# Patient Record
Sex: FEMALE | Race: BLACK OR AFRICAN AMERICAN | HISPANIC OR LATINO | URBAN - METROPOLITAN AREA
[De-identification: names, ages, dates, MRNs, and addresses within clinical notes are randomized per-mention and may not be internally consistent; named-entity substitution may affect disease eponyms.]

---

## 2018-01-10 NOTE — PROGRESS NOTES
Assessment   1  School physical exam (V70 5) (Z02 0)  2  Large breasts (611 1) (N62)  3  Bilateral shoulder pain, unspecified chronicity (719 41) (M25 511,M25 512)  4  Low back pain (724 2) (M54 5)    Plan   3 - Yana SEGURA, Bridgette Martin (Plastic And Reconstructive Surgery) Physician Referral  Consult  Status: Hold For - Scheduling  Requested for: [id number]  Ordered; For: Bilateral shoulder pain, unspecified chronicity, Large breasts; Ordered By: Liza Escobedo  Performed:   Due: [id number]     Discussion/Summary    Impression:   No development, elimination, feeding, skin and sleep concerns  Growth concerns include body mass index of 30  no medical problems  Anticipatory guidance addressed as per the history of present illness section  no vaccines in office due to refrigerator problem, pt to obtain required vaccinations at other office or [facility]  Information discussed with patient  Referred to plastic surg for breast problem  f/u here prn/routine  The patient was counseled regarding instructions for management, risk factor reductions, impressions  Chief Complaint  Pt presents today for a college physical   Pt would like to discuss about breast reduction  History of Present Illness  HM, 12-18 years Female (Brief): Radha Harris presents today for routine health maintenance with her here for college physical    General Health: The child's health since the last visit is described as good  Dental hygiene: Good  Caregiver concerns:   Caregivers deny concerns regarding nutrition, sleep, behavior, development and elimination  Menstrual status: The patient is menarcheal    Nutrition/Elimination:   Diet:  her current diet is diverse and healthy  No elimination issues are expressed  Sleep:  No sleep issues are reported  Behavior: No behavior issues identified  Health Risks:  No significant risk factors are identified  no tuberculosis risk factors  Safety elements used:   safety elements were discussed and are adequate  Childcare/School:   Sports Participation Questions:   HPI: breast reduction referral requested- states slows her breathing down, makes harder to breathe, physical activities restricted due to size and discomfort  hasn't been wearing a bra lately, or just wears spandex pull-over type, was getting mid shoulder and upper back pains when she was wearing regular bras  was on depo then changed to pill, now back on depo- new shot july 5th, [facility], last speculum/GYN exam was more than 2 years ago when she wanted to be checked for STD's      Review of Systems    Constitutional: No complaints of fever or chills, feels well, no tiredness, no recent weight gain or loss  Eyes: No complaints of eye pain, no discharge, no eyesight problems, eyes do not itch, no red or dry eyes  ENT: no complaints of nasal discharge, no hoarseness, no earache, no nosebleeds, no loss of hearing, no sore throat  Cardiovascular: No complaints of chest pain, no palpitations, normal heart rate, no lower extremity edema  Respiratory: No complaints of cough, no shortness of breath, no wheezing, no leg claudication  Gastrointestinal: No complaints of abdominal pain, no nausea or vomiting, no constipation, no diarrhea or bloody stools  Genitourinary: No complaints of incontinence, no pelvic pain, no dysuria or dysmenorrhea, no abnormal vaginal bleeding or vaginal discharge  Musculoskeletal: also c/o being "knock-kneed," and low back pains on and off, but as noted in HPI, no limb swelling, no myalgias and no joint swelling  Integumentary: No complaints of skin rash, no skin lesions or wounds, no itching, no breast pain, no breast lump  Neurological: No complaints of headache, no numbness or tingling, no confusion, no dizziness, no limb weakness, no convulsions or fainting, no difficulty walking     Psychiatric: No complaints of feeling depressed, no suicidal thoughts, no emotional problems, no anxiety, no sleep disturbances, no change in personality  Endocrine: No complaints of feeling weak, no muscle weakness, no deepening of voice, no hot flashes or proptosis  Hematologic/Lymphatic: No complaints of swollen glands, no neck swollen glands, does not bleed or bruise easily  ROS reported by the patient  Active Problems   1  Abdominal pain in female (789 00) (R10 9)  2  Flank pain (789 09) (R10 9)  3  Insomnia (780 52) (G47 00)  4  Lipid screening (V77 91) (Z13 220)  5  Narcolepsy (347 00) (G47 419)  6  UTI (urinary tract infection) (599 0) (N39 0)    Past Medical History    · History of asthma (V12 69) (Z87 09)    Surgical History    · Denied: History Of Prior Surgery    Family History  Mother    · Family history of lung cancer (V16 1) (Z80 1)   · Family history of malignant neoplasm (V16 9) (Z80 9)  Father    · Family history of hypertension (V17 49) (Z82 49)   · Family history of High cholesterol  Brother    · Family history of asthma (V17 5) (Z82 5)  Grandmother    · Family history of arthritis (V17 7) (Z82 61)   · Family history of diabetes mellitus (V18 0) (Z83 3)   · Family history of malignant neoplasm (V16 9) (Z80 9)    Social History    · Always uses seat belt   · Denied: History of Caffeine use   · Currently working   · Lives with significant other   · Never a smoker   · Never smoked any substance (V49 89) (Z78 9)   · Single   · Social alcohol use (Z78 9)   · Work history    Current Meds  1  Adderall 10 MG Oral Tablet; Therapy: (Recorded:[id number]) to Recorded  2  Adderall 20 MG Oral Tablet; Therapy: (Recorded:27Jun2016) to Recorded    Allergies   1   No Known Drug Allergies    Vitals   Recorded: [id number] 16:05BK   Systolic 618   Diastolic 70   Heart Rate 98   Temperature 97 8 F   O2 Saturation 98   Height 5 ft 4 in   Weight 175 lb    BMI Calculated 30 04   BSA Calculated 1 85   BMI Percentile 94 %   2-20 Stature Percentile 46 %   2-20 Weight Percentile 94 % Physical Exam    Constitutional - General appearance: Abnormal  obese  Head and Face - Head and face: Normocephalic, atraumatic  Palpation of the face and sinuses: Normal, no sinus tenderness  Eyes - Conjunctiva and lids: No injection, edema or discharge  Pupils and irises: Equal, round, reactive to light bilaterally  Ears, Nose, Mouth, and Throat - External inspection of ears and nose: Normal without deformities or discharge  Otoscopic examination: Tympanic membranes gray, translucent with good bony landmarks and light reflex  Canals patent without erythema  Hearing: Normal  Lips, teeth, and gums: Normal, good dentition  Oropharynx: Moist mucosa, normal tongue and tonsils without lesions  Neck - Neck: Supple, symmetric, no masses  Thyroid: No thyromegaly  Pulmonary - Respiratory effort: Normal respiratory rate and rhythm, no increased work of breathing  Percussion of chest: Normal  Auscultation of lungs: Clear bilaterally  Cardiovascular - Auscultation of heart: Regular rate and rhythm, normal S1 and S2, no murmur  Carotid pulses: Normal, 2+ bilaterally  Abdominal aorta: Normal  Femoral pulses: Normal, 2+ bilaterally  Pedal pulses: Normal, 2+ bilaterally  Peripheral vascular exam: Normal  Examination of extremities for edema and/or varicosities: Normal    Chest - Breasts: Abnormal  Breasts: enlargement  Right breast: no erythema and no swelling  Left breast: no erythema and no swelling  Chest: Normal    Abdomen - Abdomen: Normal bowel sounds, soft, non-tender, no masses  Skin findings: striae  Liver and spleen: No hepatomegaly or splenomegaly  Examination for hernias: No hernias palpated  No ventral hernia was palpated  No umbilical hernia was palpated  Lymphatic - Palpation of lymph nodes in neck: No anterior or posterior cervical lymphadenopathy  Palpation of lymph nodes in axillae: No lymphadenopathy  Palpation of lymph nodes in other areas: No lymphadenopathy     Musculoskeletal - Gait and station: Normal gait  Digits and nails: Normal without clubbing or cyanosis  Inspection/palpation of joints, bones, and muscles: Normal  Evaluation for scoliosis: No scoliosis on exam  Range of motion: Normal  Stability: No joint instability  Muscle strength/tone: Normal    Skin - Skin and subcutaneous tissue: Normal  Skin Inspection: a single tattoo observed (on the abdomen )  tattoo  Palpation of skin and subcutaneous tissue: No rash or lesions  Neurologic - Cranial nerves: Normal  Cortical function: Normal  Reflexes: Normal  Sensation: Normal  Coordination: Normal    Psychiatric - Orientation to person, place, and time: Normal  Mood and affect: Normal       Procedure    Procedure: Hearing Acuity Test    Indication: Routine screeing  Audiometry: Normal bilaterally  Hearing in the right ear: 25 decibals at 500 hertz, 25 decibals at 1000 hertz, 25 decibals at 2000 hertz, 25 decibals at 4000 hertz, 25 decibals at 6000 hertz and 25 decibals at 8000 hertz  Hearing in the left ear: 25 decibals at 500 hertz, 25 decibals at 1000 hertz, 25 decibals at 2000 hertz, 25 decibals at 4000 hertz, 25 decibals at 6000 hertz and 25 decibals at 8000 hertz  Procedure: Visual Acuity Test    Indication: routine screening  Results: 20/20 in both eyes without corrective device, 20/20 in the right eye without corrective device, 20/20 in the left eye without corrective device normal in both eyes     Color vision was screened with the [place] Test and the results were normal       Signatures   Electronically signed by : Suzanne Norton DO; Sep  6 2016  3:36PM EST                       (Author)

## 2018-01-15 NOTE — MISCELLANEOUS
Message  Message Free Text Note Form: I called pt and advised that the UTI is resistant to the bactrim, to d/c bactrim and  and start new abx that I escribed to pharmacy- cipro rx'd  PUI      Plan    1  Sulfamethoxazole-Trimethoprim 800-160 MG Oral Tablet (Bactrim DS)    2   Ciprofloxacin HCl - 500 MG Oral Tablet; TAKE 1 TABLET EVERY 12 HOURS DAILY    Signatures   Electronically signed by : Gemini García DO; Oct  9 2016 10:39AM EST                       (Author)

## 2018-08-13 ENCOUNTER — HOSPITAL ENCOUNTER (OUTPATIENT)
Dept: RADIOLOGY | Facility: HOSPITAL | Age: 18
Discharge: HOME | End: 2018-08-13
Attending: FAMILY MEDICINE
Payer: COMMERCIAL

## 2018-08-13 ENCOUNTER — TRANSCRIBE ORDERS (OUTPATIENT)
Dept: ADMINISTRATIVE | Facility: HOSPITAL | Age: 18
End: 2018-08-13

## 2018-08-13 DIAGNOSIS — K21.9 GASTROESOPHAGEAL REFLUX DISEASE WITHOUT ESOPHAGITIS: ICD-10-CM

## 2018-08-13 DIAGNOSIS — R19.8 ABDOMINAL COMPLAINTS: ICD-10-CM

## 2018-08-13 DIAGNOSIS — K21.9 GASTROESOPHAGEAL REFLUX DISEASE WITHOUT ESOPHAGITIS: Primary | ICD-10-CM

## 2018-08-13 DIAGNOSIS — G47.429 NARCOLEPSY DUE TO UNDERLYING CONDITION WITHOUT CATAPLEXY: ICD-10-CM

## 2018-08-13 PROCEDURE — 74018 RADEX ABDOMEN 1 VIEW: CPT

## 2020-02-19 ENCOUNTER — TELEPHONE (OUTPATIENT)
Dept: FAMILY MEDICINE CLINIC | Facility: CLINIC | Age: 20
End: 2020-02-19

## 2020-02-19 NOTE — TELEPHONE ENCOUNTER
Patient called first asking who her PCP was when she was a patient here in [facility], which I confirmed was Dr Spencer Boykin  Patient then asked if she was the doctor who referred her for a breast reduction, which with research, was discussed at office appointment on 7/28/2016  However, being our medical records programs changed in 2018, I cannot see what referrals were ordered in 2016  Patient asked if there was any way we could find out as she wants a copy of the original referral to Dr Nakul Simmons (sp?) of [facility] for a legal proceeding  Patient stated the referral would have been August or September 2016, as her first appointment at [facility] was October 2016  I stated it would take some time and I would check to see what I could find and then call her back  Patient was agreeable and asked that a detailed message be left if she cannot answer her phone  Thank you